# Patient Record
Sex: MALE | HISPANIC OR LATINO | Employment: FULL TIME | URBAN - METROPOLITAN AREA
[De-identification: names, ages, dates, MRNs, and addresses within clinical notes are randomized per-mention and may not be internally consistent; named-entity substitution may affect disease eponyms.]

---

## 2020-12-08 ENCOUNTER — HOSPITAL ENCOUNTER (OUTPATIENT)
Dept: LAB | Facility: MEDICAL CENTER | Age: 30
End: 2020-12-08
Payer: COMMERCIAL

## 2020-12-09 LAB
COVID ORDER STATUS COVID19: NORMAL
SARS-COV-2 RNA RESP QL NAA+PROBE: NOTDETECTED
SPECIMEN SOURCE: NORMAL

## 2020-12-15 ENCOUNTER — HOSPITAL ENCOUNTER (OUTPATIENT)
Dept: LAB | Facility: MEDICAL CENTER | Age: 30
End: 2020-12-15
Payer: COMMERCIAL

## 2023-08-14 ENCOUNTER — HOSPITAL ENCOUNTER (EMERGENCY)
Facility: MEDICAL CENTER | Age: 33
End: 2023-08-14
Attending: EMERGENCY MEDICINE
Payer: COMMERCIAL

## 2023-08-14 ENCOUNTER — APPOINTMENT (OUTPATIENT)
Dept: RADIOLOGY | Facility: MEDICAL CENTER | Age: 33
End: 2023-08-14
Attending: EMERGENCY MEDICINE
Payer: COMMERCIAL

## 2023-08-14 VITALS
TEMPERATURE: 98.3 F | OXYGEN SATURATION: 97 % | SYSTOLIC BLOOD PRESSURE: 127 MMHG | RESPIRATION RATE: 18 BRPM | HEIGHT: 68 IN | DIASTOLIC BLOOD PRESSURE: 80 MMHG | BODY MASS INDEX: 32.58 KG/M2 | WEIGHT: 214.95 LBS | HEART RATE: 82 BPM

## 2023-08-14 DIAGNOSIS — R10.31 RIGHT GROIN PAIN: ICD-10-CM

## 2023-08-14 LAB
ALBUMIN SERPL BCP-MCNC: 4.6 G/DL (ref 3.2–4.9)
ALBUMIN/GLOB SERPL: 1.5 G/DL
ALP SERPL-CCNC: 104 U/L (ref 30–99)
ALT SERPL-CCNC: 27 U/L (ref 2–50)
AMORPH CRY #/AREA URNS HPF: PRESENT /HPF
ANION GAP SERPL CALC-SCNC: 12 MMOL/L (ref 7–16)
APPEARANCE UR: ABNORMAL
AST SERPL-CCNC: 20 U/L (ref 12–45)
BACTERIA #/AREA URNS HPF: NEGATIVE /HPF
BASOPHILS # BLD AUTO: 0.9 % (ref 0–1.8)
BASOPHILS # BLD: 0.08 K/UL (ref 0–0.12)
BILIRUB SERPL-MCNC: 0.5 MG/DL (ref 0.1–1.5)
BILIRUB UR QL STRIP.AUTO: NEGATIVE
BUN SERPL-MCNC: 15 MG/DL (ref 8–22)
CALCIUM ALBUM COR SERPL-MCNC: 9.8 MG/DL (ref 8.5–10.5)
CALCIUM SERPL-MCNC: 10.3 MG/DL (ref 8.5–10.5)
CHLORIDE SERPL-SCNC: 100 MMOL/L (ref 96–112)
CO2 SERPL-SCNC: 23 MMOL/L (ref 20–33)
COLOR UR: YELLOW
CREAT SERPL-MCNC: 0.78 MG/DL (ref 0.5–1.4)
EOSINOPHIL # BLD AUTO: 0.19 K/UL (ref 0–0.51)
EOSINOPHIL NFR BLD: 2 % (ref 0–6.9)
EPI CELLS #/AREA URNS HPF: NEGATIVE /HPF
ERYTHROCYTE [DISTWIDTH] IN BLOOD BY AUTOMATED COUNT: 43.1 FL (ref 35.9–50)
GFR SERPLBLD CREATININE-BSD FMLA CKD-EPI: 119 ML/MIN/1.73 M 2
GLOBULIN SER CALC-MCNC: 3 G/DL (ref 1.9–3.5)
GLUCOSE SERPL-MCNC: 113 MG/DL (ref 65–99)
GLUCOSE UR STRIP.AUTO-MCNC: NEGATIVE MG/DL
HCT VFR BLD AUTO: 47.6 % (ref 42–52)
HGB BLD-MCNC: 16.5 G/DL (ref 14–18)
HYALINE CASTS #/AREA URNS LPF: ABNORMAL /LPF
IMM GRANULOCYTES # BLD AUTO: 0.03 K/UL (ref 0–0.11)
IMM GRANULOCYTES NFR BLD AUTO: 0.3 % (ref 0–0.9)
KETONES UR STRIP.AUTO-MCNC: NEGATIVE MG/DL
LEUKOCYTE ESTERASE UR QL STRIP.AUTO: NEGATIVE
LIPASE SERPL-CCNC: 28 U/L (ref 11–82)
LYMPHOCYTES # BLD AUTO: 2.14 K/UL (ref 1–4.8)
LYMPHOCYTES NFR BLD: 23 % (ref 22–41)
MCH RBC QN AUTO: 30.8 PG (ref 27–33)
MCHC RBC AUTO-ENTMCNC: 34.7 G/DL (ref 32.3–36.5)
MCV RBC AUTO: 88.8 FL (ref 81.4–97.8)
MICRO URNS: ABNORMAL
MONOCYTES # BLD AUTO: 0.46 K/UL (ref 0–0.85)
MONOCYTES NFR BLD AUTO: 4.9 % (ref 0–13.4)
NEUTROPHILS # BLD AUTO: 6.4 K/UL (ref 1.82–7.42)
NEUTROPHILS NFR BLD: 68.9 % (ref 44–72)
NITRITE UR QL STRIP.AUTO: NEGATIVE
NRBC # BLD AUTO: 0 K/UL
NRBC BLD-RTO: 0 /100 WBC (ref 0–0.2)
PH UR STRIP.AUTO: 7 [PH] (ref 5–8)
PLATELET # BLD AUTO: 247 K/UL (ref 164–446)
PMV BLD AUTO: 9.6 FL (ref 9–12.9)
POTASSIUM SERPL-SCNC: 4.1 MMOL/L (ref 3.6–5.5)
PROT SERPL-MCNC: 7.6 G/DL (ref 6–8.2)
PROT UR QL STRIP: NEGATIVE MG/DL
RBC # BLD AUTO: 5.36 M/UL (ref 4.7–6.1)
RBC # URNS HPF: ABNORMAL /HPF
RBC UR QL AUTO: NEGATIVE
SODIUM SERPL-SCNC: 135 MMOL/L (ref 135–145)
SP GR UR STRIP.AUTO: 1.03
UROBILINOGEN UR STRIP.AUTO-MCNC: 1 MG/DL
WBC # BLD AUTO: 9.3 K/UL (ref 4.8–10.8)
WBC #/AREA URNS HPF: ABNORMAL /HPF

## 2023-08-14 PROCEDURE — 99284 EMERGENCY DEPT VISIT MOD MDM: CPT | Mod: EDC

## 2023-08-14 PROCEDURE — 96374 THER/PROPH/DIAG INJ IV PUSH: CPT | Mod: EDC

## 2023-08-14 PROCEDURE — 80053 COMPREHEN METABOLIC PANEL: CPT

## 2023-08-14 PROCEDURE — 83690 ASSAY OF LIPASE: CPT

## 2023-08-14 PROCEDURE — 700105 HCHG RX REV CODE 258: Performed by: EMERGENCY MEDICINE

## 2023-08-14 PROCEDURE — 700111 HCHG RX REV CODE 636 W/ 250 OVERRIDE (IP): Performed by: EMERGENCY MEDICINE

## 2023-08-14 PROCEDURE — 81001 URINALYSIS AUTO W/SCOPE: CPT

## 2023-08-14 PROCEDURE — 76870 US EXAM SCROTUM: CPT

## 2023-08-14 PROCEDURE — 36415 COLL VENOUS BLD VENIPUNCTURE: CPT | Mod: EDC

## 2023-08-14 PROCEDURE — 74176 CT ABD & PELVIS W/O CONTRAST: CPT

## 2023-08-14 PROCEDURE — 85025 COMPLETE CBC W/AUTO DIFF WBC: CPT

## 2023-08-14 RX ORDER — ONDANSETRON 2 MG/ML
4 INJECTION INTRAMUSCULAR; INTRAVENOUS ONCE
Status: DISCONTINUED | OUTPATIENT
Start: 2023-08-14 | End: 2023-08-14 | Stop reason: HOSPADM

## 2023-08-14 RX ORDER — KETOROLAC TROMETHAMINE 30 MG/ML
15 INJECTION, SOLUTION INTRAMUSCULAR; INTRAVENOUS ONCE
Status: COMPLETED | OUTPATIENT
Start: 2023-08-14 | End: 2023-08-14

## 2023-08-14 RX ORDER — SODIUM CHLORIDE 9 MG/ML
1000 INJECTION, SOLUTION INTRAVENOUS ONCE
Status: COMPLETED | OUTPATIENT
Start: 2023-08-14 | End: 2023-08-14

## 2023-08-14 RX ADMIN — SODIUM CHLORIDE 1000 ML: 9 INJECTION, SOLUTION INTRAVENOUS at 15:15

## 2023-08-14 RX ADMIN — KETOROLAC TROMETHAMINE 15 MG: 30 INJECTION, SOLUTION INTRAMUSCULAR; INTRAVENOUS at 15:12

## 2023-08-14 ASSESSMENT — LIFESTYLE VARIABLES
CONSUMPTION TOTAL: INCOMPLETE
TOTAL SCORE: 0
HAVE PEOPLE ANNOYED YOU BY CRITICIZING YOUR DRINKING: NO
TOTAL SCORE: 0
EVER FELT BAD OR GUILTY ABOUT YOUR DRINKING: NO
EVER HAD A DRINK FIRST THING IN THE MORNING TO STEADY YOUR NERVES TO GET RID OF A HANGOVER: NO
HAVE YOU EVER FELT YOU SHOULD CUT DOWN ON YOUR DRINKING: NO
TOTAL SCORE: 0
DO YOU DRINK ALCOHOL: YES

## 2023-08-14 NOTE — ED TRIAGE NOTES
Eulalio Mcneill  33 y.o.  Chief Complaint   Patient presents with    Groin Pain     RIGHT-sided  Sudden onset this morning  Radiates to R testicle  Describes pain as 7/10 pressure  Denies N/V/D/hematuria     Ambulatory to triage with steady gait for above. A & O x 4, GCS 15.    States that when he first woke up this morning pain was 7-8/10 to R groin radiating to R flank and R testicle.  States that pain is now 2/10 only to R groin/R testicle.    Hx. Kidney stones    Triage process explained to patient, apologized for wait time, and returned to lobby.

## 2023-08-14 NOTE — ED NOTES
First interaction with patient and wife.  Assumed care at this time.  Pt reports he woke up this morning with pain to R flank and R groin that radiates up to his abdomen. Pt reports pain was initially severe but has since subsided. Pt also reports slight pain to R testicle, denies swelling or redness. Pt reports he is able to urinate without difficulty. Pt denies fevers or recent illness. Pt does report that last week he threw his back out but went to a PT and that pain has resolved. Pt awake and alert, respirations even/unlabored. Skin PWD.     Pt in gown.  Patient's NPO status explained.  Call light provided.  Chart up for ERP.

## 2023-08-14 NOTE — ED PROVIDER NOTES
ED Provider Note    CHIEF COMPLAINT  Chief Complaint   Patient presents with    Groin Pain     RIGHT-sided  Sudden onset this morning  Radiates to R testicle  Describes pain as 7/10 pressure  Denies N/V/D/hematuria     EXTERNAL RECORDS REVIEWED  Records review shows the patient has not been seen since 2016 when he was seen at urgent care for abdominal pain.    HPI/ROS  LIMITATION TO HISTORY   Select: : None  OUTSIDE HISTORIAN(S):  None    Eulalio Mcneill is a 33 y.o. male who presents to the Emergency Department with right sided groin pain onset this morning. He notes that this morning he had substantial pain, prompting him to the ED today for further evaluation. Patient reports it feels similar to kidney stones that he has had previously and denies any surgeries due to the previous kidney stone. He adds there is pain present in the right testicle, but denies any swelling present. Patient denies any hematuria, dysuria, vomiting, or diarrhea. Present at bedside in the ED, patient states no pain is present, but the pain is exacerbated with walking.    PAST MEDICAL HISTORY  Past Medical History:   Diagnosis Date    Kidney stones       SURGICAL HISTORY  Past Surgical History:   Procedure Laterality Date    OPEN REDUCTION      nasal fx      FAMILY HISTORY  Family History   Problem Relation Age of Onset    Hypertension Mother     Thyroid Mother     Genitourinary () Problems Mother         kidney stone    Diabetes Father     Genitourinary () Problems Father         kidney stones    Hypertension Brother         x1 brother    Diabetes Paternal Grandmother     Diabetes Paternal Grandfather      SOCIAL HISTORY   reports that he has quit smoking. His smoking use included cigarettes. He has never used smokeless tobacco. He reports current alcohol use. He reports that he does not use drugs.    CURRENT MEDICATIONS  Discharge Medication List as of 8/14/2023  7:26 PM        CONTINUE these medications which have NOT CHANGED     "Details   dicyclomine (BENTYL) 10 MG Cap Take 1 Cap by mouth 4 Times a Day,Before Meals and at Bedtime., Disp-120 Cap, R-3, Normal      hydrocodone-acetaminophen (NORCO) 5-325 MG TABS per tablet Take 1-2 Tabs by mouth every 6 hours as needed., Disp-20 Tab, R-0, Print Rx Paper      tamsulosin (FLOMAX) 0.4 MG capsule Take 1 Cap by mouth ONE-HALF HOUR AFTER BREAKFAST. Until stone passes, Disp-30 Cap, R-0, Print Rx Paper           ALLERGIES  Patient has no known allergies.    PHYSICAL EXAM  /83   Pulse 82   Temp 37 °C (98.6 °F) (Temporal)   Resp 16   Ht 1.727 m (5' 8\")   Wt 97.5 kg (214 lb 15.2 oz)   SpO2 97%      Constitutional: Nontoxic appearing. Alert in no apparent distress.  HENT: Normocephalic, Atraumatic. Bilateral external ears normal. Nose normal.  Moist mucous membranes.  Oropharynx clear.  Eyes: Pupils are equal and reactive. Conjunctiva normal.   Neck: Supple, full range of motion  Heart: Regular rate and rhythm.  No murmurs.    Lungs: No respiratory distress, normal work of breathing. Lungs clear to auscultation bilaterally.  Abdomen Soft, no distention.  No tenderness to palpation.  Musculoskeletal: Atraumatic. No obvious deformities noted.  No lower extremity edema.  Skin: Warm, Dry.  No erythema, No rash.   Neurologic: Alert and oriented x3. Moving all extremities spontaneously without focal deficits.  Psychiatric: Affect normal, Mood normal, Appears appropriate and not intoxicated.    DIAGNOSTIC STUDIES / PROCEDURES    LABS  Labs Reviewed   COMP METABOLIC PANEL - Abnormal; Notable for the following components:       Result Value    Glucose 113 (*)     Alkaline Phosphatase 104 (*)     All other components within normal limits   URINALYSIS - Abnormal; Notable for the following components:    Character Cloudy (*)     All other components within normal limits   URINE MICROSCOPIC (W/UA) - Abnormal; Notable for the following components:    WBC 0-2 (*)     RBC 2-5 (*)     All other components " within normal limits   CBC WITH DIFFERENTIAL   LIPASE   ESTIMATED GFR     RADIOLOGY  I have independently interpreted the diagnostic imaging associated with this visit and am waiting the final reading from the radiologist.   My preliminary interpretation is a follows: no hydronephrosis  Radiologist interpretation:   [id number]   Final Result      Normal testicles. No testicular torsion or testicular mass lesions.      CT-RENAL COLIC EVALUATION(A/P W/O)   Final Result      1.  No obstructing stone bilaterally. No hydronephrosis.   2.  Nonobstructive, punctate left renal stone.        COURSE & MEDICAL DECISION MAKING    2:53 PM - Evaluated patient at bedside. Plan of care was discussed including ordering imaging and lab work for further evaluation. Patient will be medicated with Toradol 15 mg injection for his symptoms. Patient verbalized his understanding and agrees to the treatment care plan.     ED Observation Status? Yes; I am placing the patient in to an observation status due to a diagnostic uncertainty as well as therapeutic intensity. Patient placed in observation status at 2:56 PM, 8/14/2023.     Observation plan is as follows: Labs, imaging, and medications    5:00 PM - Patient was reevaluated at bedside. Patient reports a recurrent episode of severe pain in the right lower quadrant that radiates into the groin. The pain is now resolved. Imaging was normal and  exam was normal without swelling or discoloration at this time. Will plan for scrotal ultrasound.    Upon Reevaluation, the patient's condition has: Improved; and will be discharged.    Patient discharged from ED Observation status at 7:14 PM (Time) 8/14/2023 (Date).     INITIAL ASSESSMENT, COURSE AND PLAN  Care Narrative: Patient with history of kidney stones who presents with right groin pain which is resolved at this time. He is well appearing with normal vitals on arrival.  Labs without leukocytosis or renal dysfunction.  UA without  significant blood or signs of infection.  Imaging was performed without ureterolithiasis or hydronephrosis.  No signs of appendicitis.  Patient continues to have episodic groin pain therefore scrotal US was performed without testicular torsion.  I think intermittent torsion is unlikely and this is more likely musculoskeletal.    7:14 PM - Upon reassessment, patient is resting comfortably with normal vital signs.  No new complaints at this time.  Discussed results with patient and/or family as well as importance of primary care follow up.  Patient understands plan of care and strict return precautions for new or changing symptoms.     ADDITIONAL PROBLEM LIST  Problem #1: Right groin pain - workup reassuring, suspect musculoskeletal pain, discharge home with symptomatic care      DISPOSITION AND DISCUSSIONS    Decision tools and prescription drugs considered including, but not limited to: Pain Medications over the counter medications should be sufficient .    The patient will return for new or worsening symptoms and is stable at the time of discharge.    The patient is referred to a primary physician for blood pressure management, diabetic screening, and for all other preventative health concerns.    DISPOSITION:  Patient will be discharged home in stable condition.    FOLLOW UP:  Sari Mcfarland M.D.  [address] [facility] [id number]  [phone]    Schedule an appointment as soon as possible for a visit       [facility], Emergency Dept  [address] [place] [id number] [phone]        FINAL DIAGNOSIS  1. Right groin pain      The note accurately reflects work and decisions made by me.  Janny Villa M.D.  8/15/2023  10:46 AM    Amalia GAYTAN (Rahat), am scribing for, and in the presence of, Janny Villa M.D..    Electronically signed by: Amalia Lara), 8/14/2023    Janny GAYTAN M.D. personally performed the services described in this documentation,  as scribed by Amalia Smith in my presence, and it is both accurate and complete.

## 2023-08-14 NOTE — ED NOTES
Pt resting calmly in position of comfort, pending CT. Pt aware of POC, denies needs. Call light within reach.

## 2023-08-15 NOTE — DISCHARGE INSTRUCTIONS
You were seen in the Emergency Department for groin and flank pain.    Labs, CT scan, ultrasound were completed without significant acute abnormalities.    Please use 1,000mg of tylenol or 600mg of ibuprofen every 6 hours as needed for pain.    Please follow up with your primary care physician.    Return to the Emergency Department with worsening pain, persistent vomiting, fevers, or other concerns.